# Patient Record
Sex: MALE | Race: WHITE | NOT HISPANIC OR LATINO | Employment: OTHER | URBAN - METROPOLITAN AREA
[De-identification: names, ages, dates, MRNs, and addresses within clinical notes are randomized per-mention and may not be internally consistent; named-entity substitution may affect disease eponyms.]

---

## 2022-01-24 ENCOUNTER — TELEPHONE (OUTPATIENT)
Dept: PRIMARY CARE CLINIC | Facility: CLINIC | Age: 57
End: 2022-01-24
Payer: MEDICAID

## 2022-01-24 NOTE — TELEPHONE ENCOUNTER
Called to schedule patient for earliest available appointment, not available.      ----- Message from Leti Reid sent at 1/24/2022 12:10 PM CST -----  Contact: Curly  .Type:  Sooner Apoointment Request    Caller is requesting a sooner appointment.  Caller declined first available appointment listed below.  Caller will not accept being placed on the waitlist and is requesting a message be sent to doctor.  Name of Caller: Curly   When is the first available appointment? 3/1/22  Symptoms: to establish care, diabetes and pain   Would the patient rather a call back or a response via [facility]? call  Best Call Back Number: [phone]  Additional Information: patient is requesting to be seen today or tomorrow.   Thanks,   RP

## 2022-02-25 ENCOUNTER — TELEPHONE (OUTPATIENT)
Dept: UROLOGY | Facility: CLINIC | Age: 57
End: 2022-02-25
Payer: MEDICAID

## 2022-02-25 NOTE — TELEPHONE ENCOUNTER
----- Message from Carolyn Tan sent at 2/25/2022 12:39 PM CST -----  Contact: Patient  Patient would like a call back concerning scheduling with Dr Huddleston, patient states his PCP was sending a referral for a prostate exam. Please call to advise at [phone] (home)

## 2022-02-25 NOTE — TELEPHONE ENCOUNTER
Spoke to Mr. Roman and informed him that we are not accepting new Medicaid patients at this time, unless you already see an [facility] provider. He verbally understood. I gave him the Medicaid Escalation number to call to get set up with a Medicaid provider.